# Patient Record
Sex: MALE | Race: BLACK OR AFRICAN AMERICAN
[De-identification: names, ages, dates, MRNs, and addresses within clinical notes are randomized per-mention and may not be internally consistent; named-entity substitution may affect disease eponyms.]

---

## 2019-12-28 ENCOUNTER — HOSPITAL ENCOUNTER (EMERGENCY)
Dept: HOSPITAL 61 - ER | Age: 24
Discharge: HOME | End: 2019-12-28
Payer: COMMERCIAL

## 2019-12-28 VITALS — SYSTOLIC BLOOD PRESSURE: 119 MMHG | DIASTOLIC BLOOD PRESSURE: 75 MMHG

## 2019-12-28 VITALS — BODY MASS INDEX: 20.32 KG/M2 | HEIGHT: 72 IN | WEIGHT: 150 LBS
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
## 2019-12-28 DIAGNOSIS — Z98.890: ICD-10-CM
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
## 2019-12-28 DIAGNOSIS — S00.81XA: ICD-10-CM

## 2019-12-28 DIAGNOSIS — S63.654A: ICD-10-CM

## 2019-12-28 DIAGNOSIS — S60.511A: ICD-10-CM

## 2019-12-28 DIAGNOSIS — S10.91XA: ICD-10-CM
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
## 2019-12-28 DIAGNOSIS — S63.656A: Primary | ICD-10-CM

## 2019-12-28 PROCEDURE — 99284 EMERGENCY DEPT VISIT MOD MDM: CPT

## 2019-12-28 PROCEDURE — 73130 X-RAY EXAM OF HAND: CPT

## 2019-12-28 PROCEDURE — 29125 APPL SHORT ARM SPLINT STATIC: CPT
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
## 2022-05-05 ENCOUNTER — HOSPITAL ENCOUNTER (INPATIENT)
Dept: HOSPITAL 61 - ER | Age: 27
LOS: 1 days | Discharge: HOME | DRG: 343 | End: 2022-05-06
Attending: INTERNAL MEDICINE | Admitting: INTERNAL MEDICINE
Payer: SELF-PAY

## 2022-05-05 VITALS — HEIGHT: 71 IN | WEIGHT: 149.47 LBS | BODY MASS INDEX: 20.93 KG/M2

## 2022-05-05 VITALS — SYSTOLIC BLOOD PRESSURE: 86 MMHG | DIASTOLIC BLOOD PRESSURE: 65 MMHG

## 2022-05-05 VITALS — DIASTOLIC BLOOD PRESSURE: 39 MMHG | SYSTOLIC BLOOD PRESSURE: 100 MMHG

## 2022-05-05 DIAGNOSIS — Z79.899: ICD-10-CM

## 2022-05-05 DIAGNOSIS — Z83.3: ICD-10-CM

## 2022-05-05 DIAGNOSIS — K35.80: Primary | ICD-10-CM

## 2022-05-05 DIAGNOSIS — F17.210: ICD-10-CM

## 2022-05-05 LAB
ALBUMIN SERPL-MCNC: 4.4 G/DL (ref 3.4–5)
ALBUMIN/GLOB SERPL: 1 {RATIO} (ref 1–1.7)
ALP SERPL-CCNC: 67 U/L (ref 46–116)
ALT SERPL-CCNC: 20 U/L (ref 16–63)
ANION GAP SERPL CALC-SCNC: 7 MMOL/L (ref 6–14)
APTT PPP: YELLOW S
AST SERPL-CCNC: 16 U/L (ref 15–37)
BACTERIA #/AREA URNS HPF: 0 /HPF
BASOPHILS # BLD AUTO: 0.1 X10^3/UL (ref 0–0.2)
BASOPHILS NFR BLD: 1 % (ref 0–3)
BILIRUB SERPL-MCNC: 1.1 MG/DL (ref 0.2–1)
BILIRUB UR QL STRIP: NEGATIVE
BUN SERPL-MCNC: 10 MG/DL (ref 8–26)
BUN/CREAT SERPL: 8 (ref 6–20)
CALCIUM SERPL-MCNC: 9.7 MG/DL (ref 8.5–10.1)
CHLORIDE SERPL-SCNC: 102 MMOL/L (ref 98–107)
CO2 SERPL-SCNC: 29 MMOL/L (ref 21–32)
CREAT SERPL-MCNC: 1.2 MG/DL (ref 0.7–1.3)
EOSINOPHIL NFR BLD: 0.1 X10^3/UL (ref 0–0.7)
EOSINOPHIL NFR BLD: 1 % (ref 0–3)
ERYTHROCYTE [DISTWIDTH] IN BLOOD BY AUTOMATED COUNT: 13.7 % (ref 11.5–14.5)
FIBRINOGEN PPP-MCNC: CLEAR MG/DL
GFR SERPLBLD BASED ON 1.73 SQ M-ARVRAT: 89.3 ML/MIN
GLUCOSE SERPL-MCNC: 86 MG/DL (ref 70–99)
HCT VFR BLD CALC: 43.9 % (ref 39–53)
HGB BLD-MCNC: 15 G/DL (ref 13–17.5)
INFLUENZA A PATIENT: NEGATIVE
INFLUENZA B PATIENT: NEGATIVE
LIPASE: 251 U/L (ref 73–393)
LYMPHOCYTES # BLD: 1.1 X10^3/UL (ref 1–4.8)
LYMPHOCYTES NFR BLD AUTO: 9 % (ref 24–48)
MCH RBC QN AUTO: 30 PG (ref 25–35)
MCHC RBC AUTO-ENTMCNC: 34 G/DL (ref 31–37)
MCV RBC AUTO: 89 FL (ref 79–100)
MONO #: 0.9 X10^3/UL (ref 0–1.1)
MONOCYTES NFR BLD: 8 % (ref 0–9)
NEUT #: 9.3 X10^3/UL (ref 1.8–7.7)
NEUTROPHILS NFR BLD AUTO: 82 % (ref 31–73)
NITRITE UR QL STRIP: NEGATIVE
PH UR STRIP: 7 [PH]
PLATELET # BLD AUTO: 284 X10^3/UL (ref 140–400)
POTASSIUM SERPL-SCNC: 3.8 MMOL/L (ref 3.5–5.1)
PROT SERPL-MCNC: 8.6 G/DL (ref 6.4–8.2)
PROT UR STRIP-MCNC: NEGATIVE MG/DL
RBC # BLD AUTO: 4.97 X10^6/UL (ref 4.3–5.7)
RBC #/AREA URNS HPF: 0 /HPF (ref 0–2)
SODIUM SERPL-SCNC: 138 MMOL/L (ref 136–145)
UROBILINOGEN UR-MCNC: 0.2 MG/DL
WBC # BLD AUTO: 11.3 X10^3/UL (ref 4–11)
WBC #/AREA URNS HPF: (no result) /HPF (ref 0–4)

## 2022-05-05 PROCEDURE — 83690 ASSAY OF LIPASE: CPT

## 2022-05-05 PROCEDURE — 96374 THER/PROPH/DIAG INJ IV PUSH: CPT

## 2022-05-05 PROCEDURE — 81001 URINALYSIS AUTO W/SCOPE: CPT

## 2022-05-05 PROCEDURE — 74177 CT ABD & PELVIS W/CONTRAST: CPT

## 2022-05-05 PROCEDURE — G0378 HOSPITAL OBSERVATION PER HR: HCPCS

## 2022-05-05 PROCEDURE — 96375 TX/PRO/DX INJ NEW DRUG ADDON: CPT

## 2022-05-05 PROCEDURE — 87428 SARSCOV & INF VIR A&B AG IA: CPT

## 2022-05-05 PROCEDURE — 84484 ASSAY OF TROPONIN QUANT: CPT

## 2022-05-05 PROCEDURE — A4314 CATH W/DRAINAGE 2-WAY LATEX: HCPCS

## 2022-05-05 PROCEDURE — 85025 COMPLETE CBC W/AUTO DIFF WBC: CPT

## 2022-05-05 PROCEDURE — 96361 HYDRATE IV INFUSION ADD-ON: CPT

## 2022-05-05 PROCEDURE — A4930 STERILE, GLOVES PER PAIR: HCPCS

## 2022-05-05 PROCEDURE — 96376 TX/PRO/DX INJ SAME DRUG ADON: CPT

## 2022-05-05 PROCEDURE — 80053 COMPREHEN METABOLIC PANEL: CPT

## 2022-05-05 PROCEDURE — 93005 ELECTROCARDIOGRAM TRACING: CPT

## 2022-05-05 PROCEDURE — 83605 ASSAY OF LACTIC ACID: CPT

## 2022-05-05 PROCEDURE — 36415 COLL VENOUS BLD VENIPUNCTURE: CPT

## 2022-05-05 RX ADMIN — MORPHINE SULFATE PRN MG: 4 INJECTION, SOLUTION INTRAMUSCULAR; INTRAVENOUS at 17:23

## 2022-05-05 RX ADMIN — PIPERACILLIN SODIUM AND TAZOBACTAM SODIUM SCH MLS/HR: 3; .375 INJECTION, POWDER, LYOPHILIZED, FOR SOLUTION INTRAVENOUS at 15:25

## 2022-05-05 RX ADMIN — MORPHINE SULFATE PRN MG: 4 INJECTION, SOLUTION INTRAMUSCULAR; INTRAVENOUS at 21:23

## 2022-05-05 NOTE — EKG
[facility]

              [address] [place] [address]

Test Date:    2022               Test Time:    13:36:16

Pat Name:     SARIKA BALDERAS            Department:   

Patient ID:   [id number]           Room:          

Gender:       M                        Technician:   

:          [id number]               Requested By: GRIS PEREZ

Order Number: [id number]           Reading MD:   Edvin Zaragoza MD

                                 Measurements

Intervals                              Axis          

Rate:         65                       P:            64

FL:           160                      QRS:          14

QRSD:         100                      T:            49

QT:           378                                    

QTc:          394                                    

                           Interpretive Statements

SINUS RHYTHM

Electronically Signed On 2022 9:04:22 CDT by Edvin Zaragoza MD

## 2022-05-05 NOTE — RAD
Axial CT of the abdomen and pelvis were obtained after the administration of 75 cc Omnipaque 300. Ora
l contrast was also administered. Coronal and sagittal reformats are also available.





Indication: Right lower quadrant pain with rebound tenderness.



Comparison:  11/21/2016.



Findings:



Lung bases are clear. The heart is unenlarged.



Liver, gallbladder, spleen, adrenals kidneys are unremarkable in appearance. Pancreas is difficult to
 evaluate without oral contrast due to the patient's thin body habitus.



The stomach, small and large bowel are nondistended. The appendix is dilated up to 12 mm. There is a 
small amount of periappendiceal fluid identified. Minimal inflammation is identified. No free air. Ab
dominal aorta is nonaneurysmal. Portal, superior mesenteric and splenic veins are normal in appearanc
e. Bony structures are unremarkable in appearance. Urinary bladder is nondistended.



IMPRESSION:

1. Findings suggest early or mild appendicitis with dilation of the appendix and periappendiceal flui
d without significant inflammation.



Exposure: One or more of the following individualized dose reduction techniques were utilized for thi
s examination:  

1. Automated exposure control  

2. Adjustment of the mA and/or kV according to patient size  

3. Use of iterative reconstruction technique



Electronically signed by: Librado Riggins MD (5/5/2022 2:55 PM) [facility]

## 2022-05-05 NOTE — HP
DATE OF SERVICE: 05/05/2022

ADMIT DATE: 05/05/2022

CHIEF COMPLAINT:  Abdominal pain.



HISTORY OF PRESENT ILLNESS:  The patient is a healthy 26-year-old male who 

presented to the ER with abdominal pain.  We did a CAT scan, he has got 

appendicitis.  I discussed the case with ER physician.  The patient has been 

admitted.  We are consulting General Surgery.  He is going to surgery in the 

morning.



PAST MEDICAL HISTORY:  Left arm surgery, tobacco abuse.



ALLERGIES:  None.



FAMILY HISTORY:  Diabetes.



SOCIAL HISTORY:  He does smoke.  No drink or drugs.



MEDICATIONS:  Reviewed, please refer to the MRAD.



REVIEW OF SYSTEMS:

GENERAL:  No history of weight change, weakness or fevers.

SKIN:  No bruising, hair changes or rashes.

EYES:  No blurred, double or loss of vision.

NOSE AND THROAT:  No history of nosebleeds, hoarseness or sore throat.

HEART:  No history of palpitations, chest pain or shortness of breath on 

exertion.

LUNGS:  Denies cough, hemoptysis, wheezing or shortness of breath.

GASTROINTESTINAL:  He complains of abdominal pain.

GENITOURINARY:  No history of frequency, urgency, hesitancy or nocturia.

NEUROLOGIC:  Denies history of numbness, tingling, tremor or weakness.

PSYCHIATRIC:  No history of panic, anxiety or depression.

ENDOCRINE:  No history of heat or cold intolerance, polyuria or polydipsia.

EXTREMITIES:  Denies muscle weakness, joint pain, pain on walking or stiffness. 



PHYSICAL EXAMINATION:

VITALS:  Within normal limits and are stable.

GENERAL:  No apparent distress.  Alert and oriented.

HEENT:  Normal cephalic atraumatic, external auditory canals are patent.

EYES:  Extraocular muscles are intact, pupils are equally round and reactive to 

light and accommodation.

MUSCULOSKELETAL:  Well developed, well nourished, good range of motion.

ENDOCRINE:  No thyromegaly was palpated.

LYMPHATICS:  No cervical chain or axillary nodes were noted.

HEMATOPOIETIC:  No bruising.

NECK:  Supple, no JVD, no thyromegaly was noted.

LUNGS:  Clear to auscultation in all lung fields without rhonchi or wheezing.

HEART:  RRR, S1, S2 present.  Peripheral pulses intact, no obvious murmurs were 

noted.

ABDOMEN:  He has decreased bowel sounds with tenderness.

EXTREMITIES:  Without any cyanosis, clubbing, or edema.  Pedal pulses intact, 

Homans sign is negative.

NEUROLOGIC:  Normal speech, normal tone.  A and O x 3, moves all extremities, no

obvious focal deficits.

PSYCHIATRIC:  Normal affect, normal mood.  Stable.

SKIN:  No ulcerations or rashes, good skin turgor, no jaundice.

VASCULAR:  Good capillary refill, neurovascular bundle appears to be intact.



LABORATORY DATA:  White count 11.  Electrolytes are normal.  CT of the abdomen 

shows appendicitis.



ASSESSMENT AND PLAN:  Appendicitis.  The patient will be admitted.  We will 

start IV antibiotics, IV fluids, p.r.n. Zofran, p.r.n. morphine, home meds.  DVT

prophylaxis.  Full code.  Consult General Surgery.  He is going to surgery in 

the morning.







IGNACIA/MOE

DR: IGNACIA/ochoa   DD: 05/05/2022 16:31

DT: 05/05/2022 18:37   TID: [id number]

## 2022-05-05 NOTE — PHYS DOC
Past Medical History


Past Medical History:  No Pertinent History


Past Surgical History:  No Surgical History


Additional Past Surgical Histo:  LEFT ARM SURGERY


Smoking Status:  Current Every Day Smoker


Alcohol Use:  None


Drug Use:  None





General Adult


EDM:


Chief Complaint:  ABDOMINAL PAIN





HPI:


HPI:





Patient is a 26-year-old male who presents to the emergency department 

complaining of a slow onset of intermittent right upper and lower abdominal pain

that started approximately 22:30 yesterday while he was working at [employer].  

Patient reports he was unable to sleep and noticed his pain became constant 

approximately 3 hours prior to arrival to the emergency department.  Patient als

o complains of intermittent waves of nausea and vomiting clear vomitus mixed 

with "bile ".  Patient reports a 10 out of 10 pain at this time.  Patient 

reports having a normal bowel movement this morning.  Denies seeing blood in his

vomitus or in his stool.  Denies fever or chills.  Denies chest pains.  Patient 

denies abdominal surgical history.  Does not take prescription medications at 

home.  Does not have a primary care physician.





Review of Systems:


Review of Systems:


14 body systems of review of systems have been reviewed.  See HPI for pertinent 

positives and negative responses, otherwise all other systems are negative, 

nonpertinent or noncontributory.


Constitutional: Negative except as outlined in HPI above.


Skin: Negative except as outlined in HPI above.


Eyes:   Negative except as outlined in HPI above.


HENT: Negative except as outlined in HPI above.


Respiratory:   Negative except as outlined in HPI above.


Cardiovascular:   Negative except as outlined in HPI above.


GI:   Negative except as outlined in HPI above.


:  Negative except as outlined in HPI above.


Musculoskeletal:   Negative except as outlined in HPI above.


Integument:   Negative except as outlined in HPI above.


Neurologic:   Negative except as outlined in HPI above.


Endocrine:   Negative except as outlined in HPI above.


Lymphatic:  Negative except as outlined in HPI above.


Psychiatric:  Negative except as outlined in HPI above.





Heart Score:


C/O Chest Pain:  No


Risk Factors:


Risk Factors:  DM, Current or recent (<one month) smoker, HTN, HLP, family 

history of CAD, obesity.


Risk Scores:


Score 0 - 3:  2.5% MACE over next 6 weeks - Discharge Home


Score 4 - 6:  20.3% MACE over next 6 weeks - Admit for Clinical Observation


Score 7 - 10:  72.7% MACE over next 6 weeks - Early Invasive Strategies





Current Medications:





Current Medications








 Medications


  (Trade)  Dose


 Ordered  Sig/Jamey  Start Time


 Stop Time Status Last Admin


Dose Admin


 


 Info


  (CONTRAST GIVEN


 -- Rx MONITORING)  1 each  PRN DAILY  PRN  5/5/22 14:30


 5/7/22 14:29   





 


 Iohexol


  (Omnipaque 300


 Mg/ml)  75 ml  1X  ONCE  5/5/22 14:30


 5/5/22 14:31 DC 5/5/22 14:34


75 ML


 


 Morphine Sulfate


  (Morphine


 Sulfate)  4 mg  1X  ONCE  5/5/22 14:00


 5/5/22 14:01 DC 5/5/22 14:07


4 MG


 


 Ondansetron HCl


  (Zofran)  4 mg  1X  ONCE  5/5/22 14:00


 5/5/22 14:01 DC 5/5/22 14:06


4 MG


 


 Sodium Chloride  1,000 ml @ 


 1,000 mls/hr  1X  ONCE  5/5/22 14:00


 5/5/22 14:59 DC 5/5/22 14:05


1,000 MLS/HR











Allergies:


Allergies:





Allergies








Coded Allergies Type Severity Reaction Last Updated Verified


 


  No Known Drug Allergies    11/21/16 No











Physical Exam:


PE:





C constitutional: Well developed, well nourished, nontoxic in appearance, lying 

in left lateral recumbent position guarding abdomen.


HENT: Normocephalic, atraumatic.


Eyes: Conjunctiva normal, no discharge.


Neck: Normal range of motion, no stridor.


Cardiovascular: No cyanosis appreciated, distal cap refill less than 2 seconds.


Lungs & Thorax: Patient is in no respiratory distress, no audible adventitious 

lung sounds appreciated.


Abdomen: Abdomen is flat, no skin discoloration appreciated, hypersensitivity to

light palpation to right upper and right lower quadrant, positive McBurney's 

point tenderness, there is rebound tenderness appreciated, negative Osorio sign.


Skin: Warm, dry, no erythema, no rash.  


Back: No tenderness, no deformities.


Extremities: No tenderness, no cyanosis, no clubbing, ROM intact, no edema.  


Neurologic: Alert and oriented X 3, normal motor function, normal sensory 

function, no focal deficits noted. 


Psychologic: Affect normal, judgement normal, mood normal.





Current Patient Data:


Labs:





                                Laboratory Tests








Test


 5/5/22


13:28 5/5/22


14:04


 


White Blood Count


 11.3 x10^3/uL


(4.0-11.0)  H 





 


Red Blood Count


 4.97 x10^6/uL


(4.30-5.70) 





 


Hemoglobin


 15.0 g/dL


(13.0-17.5) 





 


Hematocrit


 43.9 %


(39.0-53.0) 





 


Mean Corpuscular Volume


 89 fL ()


 





 


Mean Corpuscular Hemoglobin 30 pg (25-35)   


 


Mean Corpuscular Hemoglobin


Concent 34 g/dL


(31-37) 





 


Red Cell Distribution Width


 13.7 %


(11.5-14.5) 





 


Platelet Count


 284 x10^3/uL


(140-400) 





 


Neutrophils (%) (Auto) 82 % (31-73)  H 


 


Lymphocytes (%) (Auto) 9 % (24-48)  L 


 


Monocytes (%) (Auto) 8 % (0-9)   


 


Eosinophils (%) (Auto) 1 % (0-3)   


 


Basophils (%) (Auto) 1 % (0-3)   


 


Neutrophils # (Auto)


 9.3 x10^3/uL


(1.8-7.7)  H 





 


Lymphocytes # (Auto)


 1.1 x10^3/uL


(1.0-4.8) 





 


Monocytes # (Auto)


 0.9 x10^3/uL


(0.0-1.1) 





 


Eosinophils # (Auto)


 0.1 x10^3/uL


(0.0-0.7) 





 


Basophils # (Auto)


 0.1 x10^3/uL


(0.0-0.2) 





 


Sodium Level


 138 mmol/L


(136-145) 





 


Potassium Level


 3.8 mmol/L


(3.5-5.1) 





 


Chloride Level


 102 mmol/L


() 





 


Carbon Dioxide Level


 29 mmol/L


(21-32) 





 


Anion Gap 7 (6-14)   


 


Blood Urea Nitrogen


 10 mg/dL


(8-26) 





 


Creatinine


 1.2 mg/dL


(0.7-1.3) 





 


Estimated GFR


(Cockcroft-Gault) 89.3  


 





 


BUN/Creatinine Ratio 8 (6-20)   


 


Glucose Level


 86 mg/dL


(70-99) 





 


Calcium Level


 9.7 mg/dL


(8.5-10.1) 





 


Total Bilirubin


 1.1 mg/dL


(0.2-1.0)  H 





 


Aspartate Amino Transferase


(AST) 16 U/L (15-37)


 





 


Alanine Aminotransferase (ALT)


 20 U/L (16-63)


 





 


Alkaline Phosphatase


 67 U/L


() 





 


Troponin I High Sensitivity 5 ng/L (4-75)   


 


Total Protein


 8.6 g/dL


(6.4-8.2)  H 





 


Albumin


 4.4 g/dL


(3.4-5.0) 





 


Albumin/Globulin Ratio 1.0 (1.0-1.7)   


 


Lipase


 251 U/L


() 





 


Lactic Acid Level


 


 1.4 mmol/L


(0.4-2.0)





                                Laboratory Tests


5/5/22 13:28








                                Laboratory Tests


5/5/22 13:28








Vital Signs:





                                   Vital Signs








  Date Time  Temp Pulse Resp B/P (MAP) Pulse Ox O2 Delivery O2 Flow Rate FiO2


 


5/5/22 14:07   20  100 Room Air  


 


5/5/22 13:15 97.9 71  115/76 (89)    





 97.9       











EKG:


EKG:


EKG performed at 1336 by ED nursing staff shows a normal sinus rhythm without 

other ectopy, her rate is 65 bpm, W[place] interval point 160, QTc interval 0.394, no 

acute STEMI, no ACS, no acute ischemia appreciated, it was noted by ED attending

physician during interpretation and elevation in V2 and V3, most likely early 

repolarization.





Radiology/Procedures:


Radiology/Procedures:


REASON: Right upper and lower quadrant pain with rebound tenderness


PROCEDURE: CT ABD PELV W/ IV CONTRST ONLY








Axial CT of the abdomen and pelvis were obtained after the administration of 75 

cc Omnipaque 300. Oral contrast was also administered. Coronal and sagittal 

reformats are also available.








Indication: Right lower quadrant pain with rebound tenderness.





Comparison:  11/21/2016.





Findings:





Lung bases are clear. The heart is unenlarged.





Liver, gallbladder, spleen, adrenals kidneys are unremarkable in appearance. 

Pancreas is difficult to evaluate without oral contrast due to the patient's 

thin body habitus.





The stomach, small and large bowel are nondistended. The appendix is dilated up 

to 12 mm. There is a small amount of periappendiceal fluid identified. Minimal 

inflammation is identified. No free air. Abdominal aorta is nonaneurysmal. 

Portal, superior mesenteric and splenic veins are normal in appearance. Bony 

structures are unremarkable in appearance. Urinary bladder is nondistended.





IMPRESSION:


1. Findings suggest early or mild appendicitis with dilation of the appendix and

periappendiceal fluid without significant inflammation.





Exposure: One or more of the following individualized dose reduction techniques 

were utilized for this examination:  


1. Automated exposure control  


2. Adjustment of the mA and/or kV according to patient size  


3. Use of iterative reconstruction technique





Electronically signed by: Librado Riggins MD (5/5/2022 2:55 PM) [facility]





Course & Med Decision Making:


Course & Med Decision Making


Pertinent Labs and Imaging studies reviewed. (See chart for details)





26-year-old male, vital signs reviewed, presents to the emergency department 

concerning right upper and lower abdominal pain since yesterday.  Patient's 

physical examination is concerning for appendicitis versus other acute abdominal

process.  Will order CBC, CMP, lactic acid, lipase, troponin I high-sensitivity,

urinalysis assay, EKG twelve-lead, 1 L normal saline bolus, 4 mg morphine, 4 mg 

Zofran.  CT abdomen pelvis with IV contrast.





Patient last consumed food yesterday evening approximately 1900, last consumed 

p.o. fluids water 8 hours prior to arrival to the emergency department.





CBC with slightly elevated neutrophilia of 11.3.  The CMP is unremarkable.  

Patient has not given a urine specimen at this time.  CT abdomen pelvis 

concerning for acute appendicitis.  Paged to Dr. Álvarez general surgeon.





Called and discussed patient case and ED work-up with general surgeon Dr. Álvarez

who agrees patient's case warrants surgical intervention, requested IV Zosyn 

regimen, admit to inpatient management physician Dr. Ivey, will place on OR 

schedule at 730 tomorrow morning.








Called and discussed patient case and ED work-up with inpatient management 

physician Dr. Ivey who is agrees to accept admission to [facility] unit.  Patient

is awaiting room assignment from house supervisor at this time, reports pain 

down to a 6 out of 10, have ordered an additional 4 mg morphine, 4 mg Zofran IV.

 Zosyn regimen per pharmacy ordered.





Dragon Disclaimer:


Dragon Disclaimer:


This electronic medical record was generated, in whole or in part, using a voice

recognition dictation system.





Departure


Departure


Impression:  


   Primary Impression:  


   Acute appendicitis


   Qualified Codes:  K35.80 - Unspecified acute appendicitis


   Additional Impression:  


   Abdominal pain


   Qualified Codes:  R10.31 - Right lower quadrant pain


Disposition:  09 ADMITTED AS INPATIENT


Admitting Physician:  HIMS (Admit to Dr. TurtlepointDr. Henri jones surgical consult, to

[facility] unit)


Condition:  GUARDED


Referrals:  


NO PCP (PCP)











GRIS PEREZ        May 5, 2022 15:28

## 2022-05-06 VITALS — SYSTOLIC BLOOD PRESSURE: 94 MMHG | DIASTOLIC BLOOD PRESSURE: 66 MMHG

## 2022-05-06 VITALS — SYSTOLIC BLOOD PRESSURE: 102 MMHG | DIASTOLIC BLOOD PRESSURE: 69 MMHG

## 2022-05-06 VITALS — SYSTOLIC BLOOD PRESSURE: 107 MMHG | DIASTOLIC BLOOD PRESSURE: 59 MMHG

## 2022-05-06 VITALS — DIASTOLIC BLOOD PRESSURE: 69 MMHG | SYSTOLIC BLOOD PRESSURE: 99 MMHG

## 2022-05-06 VITALS — SYSTOLIC BLOOD PRESSURE: 93 MMHG | DIASTOLIC BLOOD PRESSURE: 60 MMHG

## 2022-05-06 VITALS — DIASTOLIC BLOOD PRESSURE: 71 MMHG | SYSTOLIC BLOOD PRESSURE: 109 MMHG

## 2022-05-06 VITALS — SYSTOLIC BLOOD PRESSURE: 92 MMHG | DIASTOLIC BLOOD PRESSURE: 52 MMHG

## 2022-05-06 VITALS — DIASTOLIC BLOOD PRESSURE: 72 MMHG | SYSTOLIC BLOOD PRESSURE: 104 MMHG

## 2022-05-06 PROCEDURE — 0DTJ4ZZ RESECTION OF APPENDIX, PERCUTANEOUS ENDOSCOPIC APPROACH: ICD-10-PCS | Performed by: SURGERY

## 2022-05-06 RX ADMIN — PIPERACILLIN SODIUM AND TAZOBACTAM SODIUM SCH MLS/HR: 3; .375 INJECTION, POWDER, LYOPHILIZED, FOR SOLUTION INTRAVENOUS at 05:47

## 2022-05-06 RX ADMIN — PIPERACILLIN SODIUM AND TAZOBACTAM SODIUM SCH MLS/HR: 3; .375 INJECTION, POWDER, LYOPHILIZED, FOR SOLUTION INTRAVENOUS at 00:53

## 2022-05-06 RX ADMIN — MORPHINE SULFATE PRN MG: 2 INJECTION, SOLUTION INTRAMUSCULAR; INTRAVENOUS at 08:41

## 2022-05-06 RX ADMIN — MORPHINE SULFATE PRN MG: 2 INJECTION, SOLUTION INTRAMUSCULAR; INTRAVENOUS at 08:55

## 2022-05-06 NOTE — PDOC2
CONSULT


Date of Consult


Date of Consult


DATE: 5/6/22 


TIME: 07:11





Reason for Consult


Reason for Consult:


Abdominal pain





Referring Physician


Referring Physician:


Loni





Identification/Chief Complaint


Chief Complaint


Abd pain





Source


Source:  Chart review, Patient





History of Present Illness


Reason for Visit:


27 yo male with RLQ abd pain for 18 hours. CT shows signs of acute appendicitis





Past Medical History


Cardiovascular:  No pertinent hx


Pulmonary:  No pertinent hx


GI:  No pertinent hx


Heme/Onc:  No pertinent hx


Psych:  No pertinent hx


Rheumatologic:  No pertinent hx


ENT:  No pertinent hx


Renal/:  No pertinent hx


Endocrine:  No pertinent hx


Dermatology:  No pertinent hx





Past Surgical History


Past Surgical History:  No pertinent history





Family History


Family History:  No Significant





Social History


No


ALCOHOL:  rare


Drugs:  None





Current Problem List


Problem List


Problems


Medical Problems:


(1) Abdominal pain


Status: Acute  





(2) Acute appendicitis


Status: Acute  











Current Medications


Current Medications





Current Medications


Sodium Chloride 1,000 ml @  1,000 mls/hr 1X  ONCE IV  Last administered on 

5/5/22at 14:05;  Start 5/5/22 at 14:00;  Stop 5/5/22 at 14:59;  Status DC


Ondansetron HCl (Zofran) 4 mg 1X  ONCE IVP  Last administered on 5/5/22at 14:06;

 Start 5/5/22 at 14:00;  Stop 5/5/22 at 14:01;  Status DC


Morphine Sulfate (Morphine Sulfate) 4 mg 1X  ONCE IVP  Last administered on 

5/5/22at 14:07;  Start 5/5/22 at 14:00;  Stop 5/5/22 at 14:01;  Status DC


Iohexol (Omnipaque 300 Mg/ml) 75 ml 1X  ONCE IV  Last administered on 5/5/22at 

14:34;  Start 5/5/22 at 14:30;  Stop 5/5/22 at 14:31;  Status DC


Info (CONTRAST GIVEN -- Rx MONITORING) 1 each PRN DAILY  PRN MC SEE COMMENTS;  

Start 5/5/22 at 14:30;  Stop 5/7/22 at 14:29


Morphine Sulfate (Morphine Sulfate) 4 mg 1X  ONCE IVP  Last administered on 

5/5/22at 15:11;  Start 5/5/22 at 15:15;  Stop 5/5/22 at 15:16;  Status DC


Ondansetron HCl (Zofran) 4 mg 1X  ONCE IVP  Last administered on 5/5/22at 15:11;

 Start 5/5/22 at 15:15;  Stop 5/5/22 at 15:16;  Status DC


Piperacillin Sod/ Tazobactam Sod (Zosyn Per Pharmacy) 1 each PRN DAILY  PRN MC 

SEE COMMENTS;  Start 5/5/22 at 15:15


Piperacillin Sod/ Tazobactam Sod 3.375 gm/Sodium Chloride 50 ml @  100 mls/hr 

Q6HRS IV  Last administered on 5/6/22at 05:47;  Start 5/5/22 at 16:00


Ondansetron HCl (Zofran) 4 mg PRN Q8HRS  PRN IVP NAUSEA/VOMITING;  Start 5/5/22 

at 15:30;  Stop 5/6/22 at 15:29


Morphine Sulfate (Morphine Sulfate) 4 mg PRN Q2HR  PRN IVP PAIN Last 

administered on 5/5/22at 21:23;  Start 5/5/22 at 15:30;  Stop 5/6/22 at 15:29


Sodium Chloride 1,000 ml @  75 mls/hr 1X  ONCE IV  Last administered on 5/5/22at

15:26;  Start 5/5/22 at 15:30;  Stop 5/6/22 at 04:49;  Status DC


Fentanyl Citrate (Fentanyl 2ml Vial) 25 mcg PRN Q5MIN  PRN IVP MILD PAIN 1-3;  

Start 5/6/22 at 06:00;  Stop 5/7/22 at 05:59


Fentanyl Citrate (Fentanyl 2ml Vial) 50 mcg PRN Q5MIN  PRN IVP MODERATE PAIN 4-

6;  Start 5/6/22 at 06:00;  Stop 5/7/22 at 05:59


Morphine Sulfate (Morphine Sulfate) 1 mg PRN Q10MIN  PRN IVP SEVERE PAIN 7-10;  

Start 5/6/22 at 06:00;  Stop 5/7/22 at 05:59


Ringer's Solution 1,000 ml @  30 mls/hr Q24H IV ;  Start 5/6/22 at 06:00;  Stop 

5/6/22 at 17:59


Hydromorphone HCl (Dilaudid) 0.5 mg PRN Q10MIN  PRN IVP SEVERE PAIN 7-10, 2nd 

CHOICE;  Start 5/6/22 at 06:00;  Stop 5/7/22 at 05:59


Prochlorperazine Edisylate (Compazine) 5 mg PACU PRN  PRN IVP NAUSEA, MRX1;  

Start 5/6/22 at 06:00;  Stop 5/7/22 at 05:59


Nicotine (Nicoderm Cq 21mg) 1 patch PRN DAILY  PRN TD SMOKING CESSATION Last 

administered on 5/5/22at 20:27;  Start 5/5/22 at 20:15





Active Scripts


Active





Allergies


Allergies:  


Coded Allergies:  


     No Known Drug Allergies (Unverified , 11/21/16)





ROS


General:  No: Chills, Night Sweats, Fatigue, Malaise, Appetite, Other


PSYCHOLOGICAL ROS:  No: Anxiety, Behavioral Disorder, Concentration difficultie,

Decreased libido, Depression, Disorientation, Hallucinations, Hostility, 

Irritablity, Memory difficulties, Mood Swings, Obsessive thoughts, Physical abu

se, Sexual abuse, Sleep disturbances, Suicidal ideation, Other


Eyes:  No Blurry vision, No Decreased vision, No Double vision, No Dry eyes, No 

Excessive tearing, No Eye Pain, No Itchy Eyes, No Loss of vision, No 

Photophobia, No Scotomata, No Uses contacts, No Uses glasses, No Other


HEENT:  No: Heacaches, Visual Changes, Hearing change, Nasal congestion, Nasal 

discharge, Oral lesions, Sinus pain, Sore Throat, Epistaxis, Sneezing, Snoring, 

Tinnitus, Vertigo, Vocal changes, Other


ALLERGY AND IMMUNOLOGY:  No: Hives, Insect Bite Sensitivity, Itchy/Watery Eyes, 

Nasal Congestion, Post Nasal Drip, Seasonal Allergies, Other


Hematological and Lymphatic:  No: Bleeding Problems, Blood Clots, Blood 

Transfusions, Brusing, Night Sweats, Pallor, Swollen Lymph Nodes, Other


ENDOCRINE:  No: Breast Changes, Galactorrhea, Hair Pattern Changes, Hot Flashes,

Malaise/lethargy, Mood Swings, Palpitations, Polydipsia/polyuria, Skin Changes, 

Temperature Intolerance, Unexpected Weight Changes, Other


Respiratory:  No: Cough, Hemoptysis, Orthopnea, Pleuritic Pain, Shortness of 

breath, SOB with excertion, Sputum Changes, Stridor, Tachypnea, Wheezing, Other


Cardiovascular:  No Chest Pain, No Palpitations, No Orthopnea, No Paroxysmal 

Noc. Dyspnea, No Edema, No Lt Headedness, No Other


Gastrointestinal:  Yes Nausea, Yes Vomiting, Yes Abdominal Pain


Genitourinary:  No Dysuria, No Frequency, No Incontinence, No Hematuria, No 

Retention, No Discharge, No Urgency, No Pain, No Flank Pain, No Other, No , No ,

No , No , No , No , No 


Musculoskeletal:  No Gait Disturbance, No Joint Pain, No Joint Stiffness, No 

Joint Swelling, No Muscle Pain, No Muscular Weakness, No Pain In:, No Swelling 

In:, No Other


Neurological:  No Behavorial Changes, No Bowel/Bladder ControlChng, No 

Confusion, No Dizziness, No Gait Disturbance, No Headaches, No Impaired 

Coord/balance, No Memory Loss, No Numbness/Tingling, No Seizures, No Speech 

Problems, No Tremors, No Visual Changes, No Weakness, No Other


Skin:  No Dry Skin, No Eczema, No Hair Changes, No Lumps, No Mole Changes, No 

Mottling, No Nail Changes, No Pruritus, No Rash, No Skin Lesion Changes, No 

Other, No Acne





Physical Exam


General:  Alert, Oriented X3, Cooperative, mild distress


HEENT:  Atraumatic, PERRLA, EOMI


Lungs:  Clear to auscultation, Normal air movement


Heart:  Regular rate, No murmurs


Abdomen:  Normal bowel sounds, Soft, Other (TTP RLQ)


Extremities:  No edema


Skin:  No significant lesion


Neuro:  Normal speech


Psych/Mental Status:  Mental status NL





Vitals


VITALS





Vital Signs








  Date Time  Temp Pulse Resp B/P (MAP) Pulse Ox O2 Delivery O2 Flow Rate FiO2


 


5/6/22 07:04 99.4 59 13 103/55 100 Room Air  





 99.4       











Labs


Labs





Laboratory Tests








Test


 5/5/22


13:28 5/5/22


14:04 5/5/22


15:30 5/5/22


17:20


 


White Blood Count


 11.3 x10^3/uL


(4.0-11.0) 


 


 





 


Red Blood Count


 4.97 x10^6/uL


(4.30-5.70) 


 


 





 


Hemoglobin


 15.0 g/dL


(13.0-17.5) 


 


 





 


Hematocrit


 43.9 %


(39.0-53.0) 


 


 





 


Mean Corpuscular Volume 89 fL ()    


 


Mean Corpuscular Hemoglobin 30 pg (25-35)    


 


Mean Corpuscular Hemoglobin


Concent 34 g/dL


(31-37) 


 


 





 


Red Cell Distribution Width


 13.7 %


(11.5-14.5) 


 


 





 


Platelet Count


 284 x10^3/uL


(140-400) 


 


 





 


Neutrophils (%) (Auto) 82 % (31-73)    


 


Lymphocytes (%) (Auto) 9 % (24-48)    


 


Monocytes (%) (Auto) 8 % (0-9)    


 


Eosinophils (%) (Auto) 1 % (0-3)    


 


Basophils (%) (Auto) 1 % (0-3)    


 


Neutrophils # (Auto)


 9.3 x10^3/uL


(1.8-7.7) 


 


 





 


Lymphocytes # (Auto)


 1.1 x10^3/uL


(1.0-4.8) 


 


 





 


Monocytes # (Auto)


 0.9 x10^3/uL


(0.0-1.1) 


 


 





 


Eosinophils # (Auto)


 0.1 x10^3/uL


(0.0-0.7) 


 


 





 


Basophils # (Auto)


 0.1 x10^3/uL


(0.0-0.2) 


 


 





 


Sodium Level


 138 mmol/L


(136-145) 


 


 





 


Potassium Level


 3.8 mmol/L


(3.5-5.1) 


 


 





 


Chloride Level


 102 mmol/L


() 


 


 





 


Carbon Dioxide Level


 29 mmol/L


(21-32) 


 


 





 


Anion Gap 7 (6-14)    


 


Blood Urea Nitrogen


 10 mg/dL


(8-26) 


 


 





 


Creatinine


 1.2 mg/dL


(0.7-1.3) 


 


 





 


Estimated GFR


(Cockcroft-Gault) 89.3 


 


 


 





 


BUN/Creatinine Ratio 8 (6-20)    


 


Glucose Level


 86 mg/dL


(70-99) 


 


 





 


Calcium Level


 9.7 mg/dL


(8.5-10.1) 


 


 





 


Total Bilirubin


 1.1 mg/dL


(0.2-1.0) 


 


 





 


Aspartate Amino Transf


(AST/SGOT) 16 U/L (15-37) 


 


 


 





 


Alanine Aminotransferase


(ALT/SGPT) 20 U/L (16-63) 


 


 


 





 


Alkaline Phosphatase


 67 U/L


() 


 


 





 


Troponin I High Sensitivity 5 ng/L (4-75)    


 


Total Protein


 8.6 g/dL


(6.4-8.2) 


 


 





 


Albumin


 4.4 g/dL


(3.4-5.0) 


 


 





 


Albumin/Globulin Ratio 1.0 (1.0-1.7)    


 


Lipase


 251 U/L


() 


 


 





 


Lactic Acid Level


 


 1.4 mmol/L


(0.4-2.0) 


 





 


Influenza Type A Antigen


 


 


 Negative


(NEGATIVE) 





 


Influenza Type B Antigen


 


 


 Negative


(NEGATIVE) 





 


SARS-CoV-2 Antigen (Rapid)


 


 


 Negative


(NEGATIVE) 





 


Urine Collection Type    Unknown 


 


Urine Color    Yellow 


 


Urine Clarity    Clear 


 


Urine pH    7.0 


 


Urine Specific Gravity    1.010 


 


Urine Protein


 


 


 


 Negative mg/dL


(NEG-TRACE)


 


Urine Glucose (UA)


 


 


 


 Negative mg/dL


(NEG)


 


Urine Ketones (Stick)    40 mg/dL (NEG) 


 


Urine Blood    Negative (NEG) 


 


Urine Nitrite    Negative (NEG) 


 


Urine Bilirubin    Negative (NEG) 


 


Urine Urobilinogen Dipstick


 


 


 


 0.2 mg/dL (0.2


mg/dL)


 


Urine Leukocyte Esterase    Negative (NEG) 


 


Urine RBC    0 /HPF (0-2) 


 


Urine WBC    Occ /HPF (0-4) 


 


Urine Squamous Epithelial


Cells 


 


 


  /LPF 





 


Urine Bacteria    0 /HPF (0-FEW) 


 


Urine Mucus    Slight /LPF 








Laboratory Tests








Test


 5/5/22


13:28 5/5/22


14:04 5/5/22


15:30 5/5/22


17:20


 


White Blood Count


 11.3 x10^3/uL


(4.0-11.0) 


 


 





 


Red Blood Count


 4.97 x10^6/uL


(4.30-5.70) 


 


 





 


Hemoglobin


 15.0 g/dL


(13.0-17.5) 


 


 





 


Hematocrit


 43.9 %


(39.0-53.0) 


 


 





 


Mean Corpuscular Volume 89 fL ()    


 


Mean Corpuscular Hemoglobin 30 pg (25-35)    


 


Mean Corpuscular Hemoglobin


Concent 34 g/dL


(31-37) 


 


 





 


Red Cell Distribution Width


 13.7 %


(11.5-14.5) 


 


 





 


Platelet Count


 284 x10^3/uL


(140-400) 


 


 





 


Neutrophils (%) (Auto) 82 % (31-73)    


 


Lymphocytes (%) (Auto) 9 % (24-48)    


 


Monocytes (%) (Auto) 8 % (0-9)    


 


Eosinophils (%) (Auto) 1 % (0-3)    


 


Basophils (%) (Auto) 1 % (0-3)    


 


Neutrophils # (Auto)


 9.3 x10^3/uL


(1.8-7.7) 


 


 





 


Lymphocytes # (Auto)


 1.1 x10^3/uL


(1.0-4.8) 


 


 





 


Monocytes # (Auto)


 0.9 x10^3/uL


(0.0-1.1) 


 


 





 


Eosinophils # (Auto)


 0.1 x10^3/uL


(0.0-0.7) 


 


 





 


Basophils # (Auto)


 0.1 x10^3/uL


(0.0-0.2) 


 


 





 


Sodium Level


 138 mmol/L


(136-145) 


 


 





 


Potassium Level


 3.8 mmol/L


(3.5-5.1) 


 


 





 


Chloride Level


 102 mmol/L


() 


 


 





 


Carbon Dioxide Level


 29 mmol/L


(21-32) 


 


 





 


Anion Gap 7 (6-14)    


 


Blood Urea Nitrogen


 10 mg/dL


(8-26) 


 


 





 


Creatinine


 1.2 mg/dL


(0.7-1.3) 


 


 





 


Estimated GFR


(Cockcroft-Gault) 89.3 


 


 


 





 


BUN/Creatinine Ratio 8 (6-20)    


 


Glucose Level


 86 mg/dL


(70-99) 


 


 





 


Calcium Level


 9.7 mg/dL


(8.5-10.1) 


 


 





 


Total Bilirubin


 1.1 mg/dL


(0.2-1.0) 


 


 





 


Aspartate Amino Transf


(AST/SGOT) 16 U/L (15-37) 


 


 


 





 


Alanine Aminotransferase


(ALT/SGPT) 20 U/L (16-63) 


 


 


 





 


Alkaline Phosphatase


 67 U/L


() 


 


 





 


Troponin I High Sensitivity 5 ng/L (4-75)    


 


Total Protein


 8.6 g/dL


(6.4-8.2) 


 


 





 


Albumin


 4.4 g/dL


(3.4-5.0) 


 


 





 


Albumin/Globulin Ratio 1.0 (1.0-1.7)    


 


Lipase


 251 U/L


() 


 


 





 


Lactic Acid Level


 


 1.4 mmol/L


(0.4-2.0) 


 





 


Influenza Type A Antigen


 


 


 Negative


(NEGATIVE) 





 


Influenza Type B Antigen


 


 


 Negative


(NEGATIVE) 





 


SARS-CoV-2 Antigen (Rapid)


 


 


 Negative


(NEGATIVE) 





 


Urine Collection Type    Unknown 


 


Urine Color    Yellow 


 


Urine Clarity    Clear 


 


Urine pH    7.0 


 


Urine Specific Gravity    1.010 


 


Urine Protein


 


 


 


 Negative mg/dL


(NEG-TRACE)


 


Urine Glucose (UA)


 


 


 


 Negative mg/dL


(NEG)


 


Urine Ketones (Stick)    40 mg/dL (NEG) 


 


Urine Blood    Negative (NEG) 


 


Urine Nitrite    Negative (NEG) 


 


Urine Bilirubin    Negative (NEG) 


 


Urine Urobilinogen Dipstick


 


 


 


 0.2 mg/dL (0.2


mg/dL)


 


Urine Leukocyte Esterase    Negative (NEG) 


 


Urine RBC    0 /HPF (0-2) 


 


Urine WBC    Occ /HPF (0-4) 


 


Urine Squamous Epithelial


Cells 


 


 


  /LPF 





 


Urine Bacteria    0 /HPF (0-FEW) 


 


Urine Mucus    Slight /LPF 











Assessment/Plan


Assessment/Plan


Acute appendicitis plan L/S appendectomy











RAMON ADLER MD              May 6, 2022 07:15

## 2022-05-06 NOTE — PDOC
[facility] PROGRESS NOTE


Date of Service


DOS:


DATE: 5/6/22 


TIME: 09:52





Chief Complaint


Chief Complaint


Acute Appendicitis.  





-Patient underwent successful appendectomy no complications


-As needed pain control


-Advance diet as tolerated


-Okay to stop antibiotics


-Possible discharge this evening or tomorrow





History of Present Illness


History of Present Illness


5/6


Patient evaluated examined at bedside.  He had just gotten up from the floor 

from surgery was pretty lethargic still.  Was able to tell me pain controlled.  

Advancing diet as tolerated.  Possible discharge this evening or tomorrow 

morning.





Vitals/I&O


Vitals/I&O:





                                   Vital Signs








  Date Time  Temp Pulse Resp B/P (MAP) Pulse Ox O2 Delivery O2 Flow Rate FiO2


 


5/6/22 09:11  56 13 98/59 99 Room Air  


 


5/6/22 08:26       10.0 


 


5/6/22 08:11 97.8       





 97.8       














                                    I & O   


 


 5/5/22 5/5/22 5/6/22





 15:00 23:00 07:00


 


Intake Total  1050 ml 50 ml


 


Balance  1050 ml 50 ml











Physical Exam


General:  Alert, Oriented X3, Cooperative, mild distress


Heart:  Regular rate, No murmurs


Lungs:  Clear


Abdomen:  Normal bowel sounds, Soft, Other (TTP RLQ)


Extremities:  No edema


Skin:  No significant lesion





Labs


Labs:





Laboratory Tests








Test


 5/5/22


13:28 5/5/22


14:04 5/5/22


15:30 5/5/22


17:20


 


White Blood Count


 11.3 x10^3/uL


(4.0-11.0) 


 


 





 


Red Blood Count


 4.97 x10^6/uL


(4.30-5.70) 


 


 





 


Hemoglobin


 15.0 g/dL


(13.0-17.5) 


 


 





 


Hematocrit


 43.9 %


(39.0-53.0) 


 


 





 


Mean Corpuscular Volume 89 fL ()    


 


Mean Corpuscular Hemoglobin 30 pg (25-35)    


 


Mean Corpuscular Hemoglobin


Concent 34 g/dL


(31-37) 


 


 





 


Red Cell Distribution Width


 13.7 %


(11.5-14.5) 


 


 





 


Platelet Count


 284 x10^3/uL


(140-400) 


 


 





 


Neutrophils (%) (Auto) 82 % (31-73)    


 


Lymphocytes (%) (Auto) 9 % (24-48)    


 


Monocytes (%) (Auto) 8 % (0-9)    


 


Eosinophils (%) (Auto) 1 % (0-3)    


 


Basophils (%) (Auto) 1 % (0-3)    


 


Neutrophils # (Auto)


 9.3 x10^3/uL


(1.8-7.7) 


 


 





 


Lymphocytes # (Auto)


 1.1 x10^3/uL


(1.0-4.8) 


 


 





 


Monocytes # (Auto)


 0.9 x10^3/uL


(0.0-1.1) 


 


 





 


Eosinophils # (Auto)


 0.1 x10^3/uL


(0.0-0.7) 


 


 





 


Basophils # (Auto)


 0.1 x10^3/uL


(0.0-0.2) 


 


 





 


Sodium Level


 138 mmol/L


(136-145) 


 


 





 


Potassium Level


 3.8 mmol/L


(3.5-5.1) 


 


 





 


Chloride Level


 102 mmol/L


() 


 


 





 


Carbon Dioxide Level


 29 mmol/L


(21-32) 


 


 





 


Anion Gap 7 (6-14)    


 


Blood Urea Nitrogen


 10 mg/dL


(8-26) 


 


 





 


Creatinine


 1.2 mg/dL


(0.7-1.3) 


 


 





 


Estimated GFR


(Cockcroft-Gault) 89.3 


 


 


 





 


BUN/Creatinine Ratio 8 (6-20)    


 


Glucose Level


 86 mg/dL


(70-99) 


 


 





 


Calcium Level


 9.7 mg/dL


(8.5-10.1) 


 


 





 


Total Bilirubin


 1.1 mg/dL


(0.2-1.0) 


 


 





 


Aspartate Amino Transf


(AST/SGOT) 16 U/L (15-37) 


 


 


 





 


Alanine Aminotransferase


(ALT/SGPT) 20 U/L (16-63) 


 


 


 





 


Alkaline Phosphatase


 67 U/L


() 


 


 





 


Troponin I High Sensitivity 5 ng/L (4-75)    


 


Total Protein


 8.6 g/dL


(6.4-8.2) 


 


 





 


Albumin


 4.4 g/dL


(3.4-5.0) 


 


 





 


Albumin/Globulin Ratio 1.0 (1.0-1.7)    


 


Lipase


 251 U/L


() 


 


 





 


Lactic Acid Level


 


 1.4 mmol/L


(0.4-2.0) 


 





 


Influenza Type A Antigen


 


 


 Negative


(NEGATIVE) 





 


Influenza Type B Antigen


 


 


 Negative


(NEGATIVE) 





 


SARS-CoV-2 Antigen (Rapid)


 


 


 Negative


(NEGATIVE) 





 


Urine Collection Type    Unknown 


 


Urine Color    Yellow 


 


Urine Clarity    Clear 


 


Urine pH    7.0 


 


Urine Specific Gravity    1.010 


 


Urine Protein


 


 


 


 Negative mg/dL


(NEG-TRACE)


 


Urine Glucose (UA)


 


 


 


 Negative mg/dL


(NEG)


 


Urine Ketones (Stick)    40 mg/dL (NEG) 


 


Urine Blood    Negative (NEG) 


 


Urine Nitrite    Negative (NEG) 


 


Urine Bilirubin    Negative (NEG) 


 


Urine Urobilinogen Dipstick


 


 


 


 0.2 mg/dL (0.2


mg/dL)


 


Urine Leukocyte Esterase    Negative (NEG) 


 


Urine RBC    0 /HPF (0-2) 


 


Urine WBC    Occ /HPF (0-4) 


 


Urine Squamous Epithelial


Cells 


 


 


  /LPF 





 


Urine Bacteria    0 /HPF (0-FEW) 


 


Urine Mucus    Slight /LPF 











Assessment and Plan


Assessmemt and Plan


Problems


Medical Problems:


(1) Abdominal pain


Status: Acute  





(2) Acute appendicitis


Status: Acute  











Comment


Review of Relevant


I have reviewed the following items emily (where applicable) has been applied.


Medications:





Current Medications








 Medications


  (Trade)  Dose


 Ordered  Sig/Jamey


 Route


 PRN Reason  Start Time


 Stop Time Status Last Admin


Dose Admin


 


 Sodium Chloride  1,000 ml @ 


 1,000 mls/hr  1X  ONCE


 IV


   5/5/22 14:00


 5/5/22 14:59 DC 5/5/22 14:05





 


 Ondansetron HCl


  (Zofran)  4 mg  1X  ONCE


 IVP


   5/5/22 14:00


 5/5/22 14:01 DC 5/5/22 14:06





 


 Morphine Sulfate


  (Morphine


 Sulfate)  4 mg  1X  ONCE


 IVP


   5/5/22 14:00


 5/5/22 14:01 DC 5/5/22 14:07





 


 Iohexol


  (Omnipaque 300


 Mg/ml)  75 ml  1X  ONCE


 IV


   5/5/22 14:30


 5/5/22 14:31 DC 5/5/22 14:34





 


 Morphine Sulfate


  (Morphine


 Sulfate)  4 mg  1X  ONCE


 IVP


   5/5/22 15:15


 5/5/22 15:16 DC 5/5/22 15:11





 


 Ondansetron HCl


  (Zofran)  4 mg  1X  ONCE


 IVP


   5/5/22 15:15


 5/5/22 15:16 DC 5/5/22 15:11





 


 Piperacillin Sod/


 Tazobactam Sod


 3.375 gm/Sodium


 Chloride  50 ml @ 


 100 mls/hr  Q6HRS


 IV


   5/5/22 16:00


    5/6/22 05:47





 


 Morphine Sulfate


  (Morphine


 Sulfate)  4 mg  PRN Q2HR  PRN


 IVP


 PAIN  5/5/22 15:30


 5/6/22 15:29  5/5/22 21:23





 


 Sodium Chloride  1,000 ml @ 


 75 mls/hr  1X  ONCE


 IV


   5/5/22 15:30


 5/6/22 04:49 DC 5/5/22 15:26





 


 Morphine Sulfate


  (Morphine


 Sulfate)  1 mg  PRN Q10MIN  PRN


 IVP


 SEVERE PAIN 7-10  5/6/22 06:00


 5/7/22 05:59  5/6/22 08:55





 


 Ringer's Solution  1,000 ml @ 


 30 mls/hr  Q24H


 IV


   5/6/22 06:00


 5/6/22 17:59  5/6/22 07:12





 


 Hydromorphone HCl


  (Dilaudid)  0.5 mg  PRN Q10MIN  PRN


 IVP


 SEVERE PAIN 7-10, 2nd CHOICE  5/6/22 06:00


 5/7/22 05:59  5/6/22 09:14





 


 Prochlorperazine


 Edisylate


  (Compazine)  5 mg  PACU PRN  PRN


 IVP


 NAUSEA, MRX1  5/6/22 06:00


 5/7/22 05:59  5/6/22 08:55





 


 Nicotine


  (Nicoderm Cq


 21mg)  1 patch  PRN DAILY  PRN


 TD


 SMOKING CESSATION  5/5/22 20:15


    5/5/22 20:27





 


 Bupivacaine HCl/


 Epinephrine Bitart


  (Sensorcaine-Epi


 0.25%-1:[id number]


 Mpf)  30 ml  STK-MED ONCE


 INJ


   5/6/22 07:36


 5/6/22 08:03 DC 5/6/22 07:36














Justifications for Admission


Other Justification














KIP GALAN MD          May 6, 2022 09:54

## 2022-05-06 NOTE — PDOC3
Discharge Summary


Visit Information


Date of Admission:  May 5, 2022


Date of Discharge:  May 6, 2022


Final Diagnosis


Problems


Medical Problems:


(1) Abdominal pain


Status: Acute  





(2) Acute appendicitis


Status: Acute  











Brief Hospital Course


Allergies





                                    Allergies








Coded Allergies Type Severity Reaction Last Updated Verified


 


  No Known Drug Allergies    11/21/16 No








Vital Signs





Vital Signs








  Date Time  Temp Pulse Resp B/P (MAP) Pulse Ox O2 Delivery O2 Flow Rate FiO2


 


5/6/22 17:23      Room Air  


 


5/6/22 15:15 98.3 60 18 109/71 (84) 97   





 98.3       


 


5/6/22 08:26       10.0 








Lab Results





Laboratory Tests








Test


 5/5/22


13:28 5/5/22


14:04 5/5/22


15:30 5/5/22


17:20


 


White Blood Count


 11.3 x10^3/uL


(4.0-11.0) 


 


 





 


Red Blood Count


 4.97 x10^6/uL


(4.30-5.70) 


 


 





 


Hemoglobin


 15.0 g/dL


(13.0-17.5) 


 


 





 


Hematocrit


 43.9 %


(39.0-53.0) 


 


 





 


Mean Corpuscular Volume 89 fL ()    


 


Mean Corpuscular Hemoglobin 30 pg (25-35)    


 


Mean Corpuscular Hemoglobin


Concent 34 g/dL


(31-37) 


 


 





 


Red Cell Distribution Width


 13.7 %


(11.5-14.5) 


 


 





 


Platelet Count


 284 x10^3/uL


(140-400) 


 


 





 


Neutrophils (%) (Auto) 82 % (31-73)    


 


Lymphocytes (%) (Auto) 9 % (24-48)    


 


Monocytes (%) (Auto) 8 % (0-9)    


 


Eosinophils (%) (Auto) 1 % (0-3)    


 


Basophils (%) (Auto) 1 % (0-3)    


 


Neutrophils # (Auto)


 9.3 x10^3/uL


(1.8-7.7) 


 


 





 


Lymphocytes # (Auto)


 1.1 x10^3/uL


(1.0-4.8) 


 


 





 


Monocytes # (Auto)


 0.9 x10^3/uL


(0.0-1.1) 


 


 





 


Eosinophils # (Auto)


 0.1 x10^3/uL


(0.0-0.7) 


 


 





 


Basophils # (Auto)


 0.1 x10^3/uL


(0.0-0.2) 


 


 





 


Sodium Level


 138 mmol/L


(136-145) 


 


 





 


Potassium Level


 3.8 mmol/L


(3.5-5.1) 


 


 





 


Chloride Level


 102 mmol/L


() 


 


 





 


Carbon Dioxide Level


 29 mmol/L


(21-32) 


 


 





 


Anion Gap 7 (6-14)    


 


Blood Urea Nitrogen


 10 mg/dL


(8-26) 


 


 





 


Creatinine


 1.2 mg/dL


(0.7-1.3) 


 


 





 


Estimated GFR


(Cockcroft-Gault) 89.3 


 


 


 





 


BUN/Creatinine Ratio 8 (6-20)    


 


Glucose Level


 86 mg/dL


(70-99) 


 


 





 


Calcium Level


 9.7 mg/dL


(8.5-10.1) 


 


 





 


Total Bilirubin


 1.1 mg/dL


(0.2-1.0) 


 


 





 


Aspartate Amino Transf


(AST/SGOT) 16 U/L (15-37) 


 


 


 





 


Alanine Aminotransferase


(ALT/SGPT) 20 U/L (16-63) 


 


 


 





 


Alkaline Phosphatase


 67 U/L


() 


 


 





 


Troponin I High Sensitivity 5 ng/L (4-75)    


 


Total Protein


 8.6 g/dL


(6.4-8.2) 


 


 





 


Albumin


 4.4 g/dL


(3.4-5.0) 


 


 





 


Albumin/Globulin Ratio 1.0 (1.0-1.7)    


 


Lipase


 251 U/L


() 


 


 





 


Lactic Acid Level


 


 1.4 mmol/L


(0.4-2.0) 


 





 


Influenza Type A Antigen


 


 


 Negative


(NEGATIVE) 





 


Influenza Type B Antigen


 


 


 Negative


(NEGATIVE) 





 


SARS-CoV-2 Antigen (Rapid)


 


 


 Negative


(NEGATIVE) 





 


Urine Collection Type    Unknown 


 


Urine Color    Yellow 


 


Urine Clarity    Clear 


 


Urine pH    7.0 


 


Urine Specific Gravity    1.010 


 


Urine Protein


 


 


 


 Negative mg/dL


(NEG-TRACE)


 


Urine Glucose (UA)


 


 


 


 Negative mg/dL


(NEG)


 


Urine Ketones (Stick)    40 mg/dL (NEG) 


 


Urine Blood    Negative (NEG) 


 


Urine Nitrite    Negative (NEG) 


 


Urine Bilirubin    Negative (NEG) 


 


Urine Urobilinogen Dipstick


 


 


 


 0.2 mg/dL (0.2


mg/dL)


 


Urine Leukocyte Esterase    Negative (NEG) 


 


Urine RBC    0 /HPF (0-2) 


 


Urine WBC    Occ /HPF (0-4) 


 


Urine Squamous Epithelial


Cells 


 


 


  /LPF 





 


Urine Bacteria    0 /HPF (0-FEW) 


 


Urine Mucus    Slight /LPF 








Brief Hospital Course


Mr. Krause  is a 26 old male who presented with acute appendicitis.  Consulted 

general surgery.  He had laparoscopic appendectomy.  He was cleared for general 

surgery to discharge on a short course of Augmentin and pain management.





Discharge Information


Condition at Discharge:  Improved


Disposition/Orders:  D/C to Home





Justicifation of Admission Dx:


Justifications for Admission:


Justification of Admission Dx:  Yes











YECENIA CHAVEZ MD             May 6, 2022 18:37

## 2022-05-06 NOTE — PDOC4
Operative Note


Operative Note


Date: May 6, 2022 at 8:05 AM


Preoperative diagnosis: Acute appendicitis


Postoperative diagnosis: Same


Procedure: Laparoscopic appendectomy


Surgeon: Henri


Specimen: Appendix


Dictation: Patient is 26-year-old male was mated to the hospital right lower 

quadrant abdominal pain a CT scan showing signs consistent with acute a

ppendicitis.  Procedure laparoscopic appendectomy was explained to the patient 

detail all risk benefits were also discussed including bleeding infection injury

to intra-abdominal contents possible necessitating further open operations 

alternatives to this procedure also discussed with the patient who seemed to 

understand and gave a verbal written consent had procedure performed.  Patient 

was taken to the operating room placed in supine position general anesthesia was

initiated once patient was sleeping intubated his abdomen was prepped and draped

usual sterile fashion using ChloraPrep.  Area just above his umbilicus was 

injected with quarter percent Marcaine with epinephrine incision was made 11 

blade scalpel and a varies needle was placed within the abdomen creating 

pneumoperitoneum once this was complete 12 mm port was placed and a 5 mm camera 

was placed within the abdomen which was inspected no other abnormalities were 

noted other than edematous appendix.  5 mm ports placed low in the midline and a

5 mm port was placed in the right midabdomen all under direct visualization.  

The appendix was grasped retracted towards the anterior abdominal wall a window 

was propagated the base of the appendix through the mesoappendix with a [place]

dissector Endo LORI stapler was then used to staple and transect the base of the 

appendix a second load was used to staple and transect the mesoappendix.  

Appendix then placed in Endo Catch bag removed from the umbilicus right lower 

quadrant was irrigated and suctioned dry hemostasis deemed to appropriate the 

pneumoperitoneum was reduced all ports were removed the fascial defect at the 

umbilicus was closed with a figure-of-eight 0 Vicryl suture and the skin was 

reapproximated all port sites for subcuticular Monocryl Mastisol Steri-Strips 

and island dressings were applied.  Patient was awakened and extubated in the 

operating room taken to recovery in stable condition all sponge instrument 

needle counts listed as correct estimated blood loss 20 mL











RAMON ADLER MD              May 6, 2022 08:07

## 2022-05-06 NOTE — NUR
Patient discharged to ER exit accompanied by a family member. Patient to follow up with Dr. Álvarez in 2 weeks. Written discharge instructions given to patient. Patient verbalized 
understanding of information. Saline lock dc'd.

## 2022-05-06 NOTE — NUR
Pt returned to unit by bed. Awakens easily but sedated. Abd 3 lap sites d/i. IVF's intact 
and infusing. Side rails up x's 2 with call light in reach. Mother at bedside. Cont. 
monitor.